# Patient Record
Sex: FEMALE | Race: ASIAN | NOT HISPANIC OR LATINO | URBAN - METROPOLITAN AREA
[De-identification: names, ages, dates, MRNs, and addresses within clinical notes are randomized per-mention and may not be internally consistent; named-entity substitution may affect disease eponyms.]

---

## 2017-02-06 ENCOUNTER — AMBULATORY - HEALTHEAST (OUTPATIENT)
Dept: INTERNAL MEDICINE | Facility: CLINIC | Age: 27
End: 2017-02-06

## 2017-02-07 ENCOUNTER — OFFICE VISIT - HEALTHEAST (OUTPATIENT)
Dept: INTERNAL MEDICINE | Facility: CLINIC | Age: 27
End: 2017-02-07

## 2017-02-07 DIAGNOSIS — Z00.00 ROUTINE GENERAL MEDICAL EXAMINATION AT A HEALTH CARE FACILITY: ICD-10-CM

## 2017-02-07 DIAGNOSIS — R00.2 INTERMITTENT PALPITATIONS: ICD-10-CM

## 2017-02-07 LAB
CHOLEST SERPL-MCNC: 199 MG/DL
FASTING STATUS PATIENT QL REPORTED: YES
HDLC SERPL-MCNC: 85 MG/DL
LDLC SERPL CALC-MCNC: 103 MG/DL
TRIGL SERPL-MCNC: 54 MG/DL

## 2017-02-07 ASSESSMENT — MIFFLIN-ST. JEOR: SCORE: 1132.98

## 2017-02-08 ENCOUNTER — COMMUNICATION - HEALTHEAST (OUTPATIENT)
Dept: INTERNAL MEDICINE | Facility: CLINIC | Age: 27
End: 2017-02-08

## 2017-02-08 LAB
ATRIAL RATE - MUSE: 62 BPM
DIASTOLIC BLOOD PRESSURE - MUSE: NORMAL MMHG
INTERPRETATION ECG - MUSE: NORMAL
P AXIS - MUSE: -14 DEGREES
PR INTERVAL - MUSE: 150 MS
QRS DURATION - MUSE: 84 MS
QT - MUSE: 420 MS
QTC - MUSE: 426 MS
R AXIS - MUSE: 72 DEGREES
SYSTOLIC BLOOD PRESSURE - MUSE: NORMAL MMHG
T AXIS - MUSE: 59 DEGREES
VENTRICULAR RATE- MUSE: 62 BPM

## 2017-04-20 ENCOUNTER — COMMUNICATION - HEALTHEAST (OUTPATIENT)
Dept: INTERNAL MEDICINE | Facility: CLINIC | Age: 27
End: 2017-04-20

## 2017-04-20 ENCOUNTER — HOSPITAL ENCOUNTER (OUTPATIENT)
Dept: CARDIOLOGY | Facility: HOSPITAL | Age: 27
Discharge: HOME | End: 2017-04-20
Attending: INTERNAL MEDICINE

## 2017-04-20 DIAGNOSIS — Z00.00 ROUTINE GENERAL MEDICAL EXAMINATION AT A HEALTH CARE FACILITY: ICD-10-CM

## 2017-04-20 LAB
AORTIC ROOT: 2.4 CM
ASCENDING AORTA: 1.9 CM
BSA FOR ECHO PROCEDURE: 1.42 M2
CV BLOOD PRESSURE: NORMAL MMHG
CV ECHO HEIGHT: 61.5 IN
CV ECHO WEIGHT: 102 LBS
DOP CALC LVOT AREA: 2.54 CM2
DOP CALC LVOT DIAMETER: 1.8 CM
DOP CALC LVOT PEAK VEL: 109 CM/S
DOP CALC LVOT STROKE VOLUME: 61.8 CM3
DOP CALCLVOT PEAK VEL VTI: 24.3 CM
ECHO EJECTION FRACTION ESTIMATED: 65 %
EJECTION FRACTION: 71 % (ref 55–75)
FRACTIONAL SHORTENING: 39.3 % (ref 28–44)
INTERVENTRICULAR SEPTUM IN END DIASTOLE: 0.73 CM (ref 0.6–0.9)
IVS/PW RATIO: 0.9
LA AREA 1: 10.5 CM2
LA AREA 2: 8.6 CM2
LEFT ATRIUM LENGTH: 3.6 CM
LEFT ATRIUM SIZE: 2.3 CM
LEFT ATRIUM VOLUME INDEX: 15 ML/M2
LEFT ATRIUM VOLUME: 21.3 CM3
LEFT VENTRICLE CARDIAC INDEX: 2.3 L/MIN/M2
LEFT VENTRICLE CARDIAC OUTPUT: 3.2 L/MIN
LEFT VENTRICLE DIASTOLIC VOLUME INDEX: 22.5 CM3/M2 (ref 34–74)
LEFT VENTRICLE DIASTOLIC VOLUME: 31.9 CM3 (ref 46–106)
LEFT VENTRICLE HEART RATE: 52 BPM
LEFT VENTRICLE MASS INDEX: 67.3 G/M2
LEFT VENTRICLE SYSTOLIC VOLUME INDEX: 6.5 CM3/M2 (ref 11–31)
LEFT VENTRICLE SYSTOLIC VOLUME: 9.19 CM3 (ref 14–42)
LEFT VENTRICULAR INTERNAL DIMENSION IN DIASTOLE: 4.15 CM (ref 3.8–5.2)
LEFT VENTRICULAR INTERNAL DIMENSION IN SYSTOLE: 2.52 CM (ref 2.2–3.5)
LEFT VENTRICULAR MASS: 95.6 G
LEFT VENTRICULAR OUTFLOW TRACT MEAN GRADIENT: 3 MMHG
LEFT VENTRICULAR OUTFLOW TRACT MEAN VELOCITY: 85.2 CM/S
LEFT VENTRICULAR OUTFLOW TRACT PEAK GRADIENT: 5 MMHG
LEFT VENTRICULAR POSTERIOR WALL IN END DIASTOLE: 0.82 CM (ref 0.6–0.9)
LV STROKE VOLUME INDEX: 43.5 ML/M2
MITRAL VALVE E/A RATIO: 2.1
MV AVERAGE E/E' RATIO: 9.2 CM/S
MV DECELERATION TIME: 250 MS
MV E'TISSUE VEL-LAT: 15.5 CM/S
MV E'TISSUE VEL-MED: 12.1 CM/S
MV LATERAL E/E' RATIO: 8.2
MV MEDIAL E/E' RATIO: 10.5
MV PEAK A VELOCITY: 60.2 CM/S
MV PEAK E VELOCITY: 127 CM/S
NUC REST DIASTOLIC VOLUME INDEX: 1632 LBS
NUC REST SYSTOLIC VOLUME INDEX: 61.5 IN
PR MAX PG: 2 MMHG
PR PEAK VELOCITY: 67.9 CM/S
TRICUSPID VALVE ANULAR PLANE SYSTOLIC EXCURSION: 2.6 CM

## 2017-04-20 ASSESSMENT — MIFFLIN-ST. JEOR: SCORE: 1132.98

## 2017-06-05 ENCOUNTER — OFFICE VISIT - HEALTHEAST (OUTPATIENT)
Dept: INTERNAL MEDICINE | Facility: CLINIC | Age: 27
End: 2017-06-05

## 2017-06-05 DIAGNOSIS — H04.202 EXCESSIVE TEARING, LEFT: ICD-10-CM

## 2017-06-05 DIAGNOSIS — G51.0 BELL'S PALSY: ICD-10-CM

## 2017-06-05 RX ORDER — PREDNISONE 20 MG/1
TABLET ORAL
Qty: 15 TABLET | Refills: 0 | Status: SHIPPED | OUTPATIENT
Start: 2017-06-05

## 2017-06-14 ENCOUNTER — OFFICE VISIT - HEALTHEAST (OUTPATIENT)
Dept: INTERNAL MEDICINE | Facility: CLINIC | Age: 27
End: 2017-06-14

## 2017-06-14 DIAGNOSIS — G51.0 BELL PALSY: ICD-10-CM

## 2021-05-30 VITALS — HEIGHT: 62 IN | BODY MASS INDEX: 18.77 KG/M2 | WEIGHT: 102 LBS

## 2021-05-30 VITALS — HEIGHT: 62 IN | WEIGHT: 102 LBS | BODY MASS INDEX: 18.77 KG/M2

## 2021-05-31 VITALS — BODY MASS INDEX: 19.15 KG/M2 | WEIGHT: 103 LBS

## 2021-05-31 VITALS — BODY MASS INDEX: 18.68 KG/M2 | WEIGHT: 100.5 LBS

## 2021-06-08 NOTE — PROGRESS NOTES
Office Visit-New Patient   Domenico Carpenter   26 y.o. female    Date of Visit: 2/7/2017    Chief Complaint   Patient presents with     Establish Care     physical, fasting        Assessment and Plan   1. Routine general medical examination at a health care facility  Informed and advised her comprehensive physical exam is normal. Due for her first PAP smear. Prefers a woman physician. Refer to Gyne. Due for her HPV vaccine. Give first dose today, 2nd dose in a month and 3rd dose in 6 months. Check the following.   - Ambulatory referral to Gynecology  - Lipid Cascade  - Comprehensive Metabolic Panel  - HM2(CBC w/o Differential)  - Urinalysis-UC if Indicated  - HPV vaccine 9 valent 3 dose IM    2. Intermittent palpitations  Reports of intermittent heart palpitations. Last for a few seconds without sob and chest pains. Heart exam shows a faint systolic murmur but has regular rhythm. Did her EKG and this is normal. Will do a heart echo. Will inform her of her echo result.   - Electrocardiogram Perform and Read  - Echo Complete; Future       History of Present Illness   This 26 y.o. old female, new to the clinic, to establish her care and get her first physical exam. Complains of intermittent palpitations. No associated symptoms. Last for a few seconds. Cannot think of precipitating factors. Sees and hears well. Does not have chest pains and shortness of breath. Denies heartburn, nausea and abdominal pains. Does not urinary and bowel symptoms. Sleeps well. Good appetite. Maintains her weight. Does treadmill for 30 minutes 3 times a week. Falls into low BMI of 18. Menses occur monthly and last for 3 to 5 days.      Review of Systems   A 12 point comprehensive review of systems was negative except as noted..     Medications, Allergies and Problem List   Reviewed and updated             Chief Complaint   Establish Care (physical, fasting)       Patient Profile   Social History     Social History Narrative    Single.  "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 Nonsmoker. Drinks beer socially.         Past Medical History   There is no problem list on file for this patient.      Past Surgical History  She has no past surgical history on file.       Medications and Allergies   No current outpatient prescriptions on file.     No Known Allergies     Family and Social History   Family History   Problem Relation Age of Onset     Hyperlipidemia Mother      Hypertension Mother      Hyperlipidemia Father         Social History   Substance Use Topics     Smoking status: Never Smoker     Smokeless tobacco: None     Alcohol use None        Physical Exam       Physical Exam  Visit Vitals     /70     Pulse 61     Ht 5' 1.5\" (1.562 m)     Wt 102 lb (46.3 kg)     SpO2 98%     BMI 18.96 kg/m2     General Appearance:    Alert, cooperative, no distress, appears stated age, pleasant   Head:    Normocephalic, without obvious abnormality, atraumatic   Eyes:    PERRL, conjunctiva/corneas clear, EOM's intact, fundi     benign, both eyes   Ears:    Normal TM's and external ear canals, both ears   Nose:   Nares normal, septum midline, mucosa normal, no drainage    or sinus tenderness   Throat:   Lips, mucosa, and tongue normal; teeth and gums normal   Neck:   Supple, symmetrical, trachea midline, no adenopathy;     thyroid:  no enlargement/tenderness/nodules; no carotid    bruit or JVD   Back:     Symmetric, no curvature, ROM normal, no CVA tenderness   Lungs:     Clear to auscultation bilaterally, respirations unlabored   Chest Wall:    No tenderness or deformity    Heart:    Regular rate and rhythm, S1 and S2 normal, no murmur, rub   or gallop   Breast Exam:    Deferred. To be done by gynecology.   Abdomen:     Soft, non-tender, bowel sounds active all four quadrants,     no masses, no organomegaly   Genitalia:    Deferred. To be done by gynecology   Extremities:   Extremities normal, atraumatic, no cyanosis or edema   Pulses:   " 2+ and symmetric all extremities   Skin:   Skin color, texture, turgor normal, no rashes or lesions   Lymph nodes:   Cervical, supraclavicular, and axillary nodes normal   Neurologic:   CNII-XII intact, normal strength, sensation and reflexes     throughout        Additional Information        Prince Sadler MD  Internal Medicine  Contact me at [phone]     Additional Information   No current outpatient prescriptions on file.     No Known Allergies  Social History   Substance Use Topics     Smoking status: Never Smoker     Smokeless tobacco: None     Alcohol use None

## 2021-06-11 NOTE — PROGRESS NOTES
Office Visit - Follow Up   Domenico Valencia Carpenter   27 y.o. female    Date of Visit: 6/14/2017    Chief Complaint   Patient presents with     Follow-up     1 week,        Assessment and Plan   1. Bell palsy  Was seen for a Bell's palsy last week.  Had sudden onset of weakness of the left nasolabial fold and left upper eyelid.  Has weak left side of the mouth and when she smiles  left nasolabial fold is shallow.  Has weak left eye closure so the left eye is partially open.  Was assessed to be Bell's palsy.  Treated with short course of prednisone.  Left eye upper lid weakness and left shallow nasolabial fold are resolved.  Does not exhibit residual effect of Bell's palsy.    Discussed unknown cause for Bell's palsy and its pathophysiology.    Follow up as needed.  Reassured patient.       History of Present Illness   This 27 y.o. old female is here for follow-up.  Was seen a week ago for Bell's palsy presenting as weakness of the left upper lid and shallow left nasolabial fold.  Treated with short course of prednisone.  Her symptoms are  resolved.  No residual effect of Bell's palsy.  Doing and feeling well.    Review of Systems   A 12 point comprehensive review of systems was negative except as noted..     Medications, Allergies and Problem List   Reviewed and updated             Chief Complaint   Follow-up (1 week,)       Patient Profile   Social History     Social History Narrative    Single. 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 Nonsmoker. Drinks beer socially.         Past Medical History   There is no problem list on file for this patient.      Past Surgical History  She has no past surgical history on file.       Medications and Allergies   Current Outpatient Prescriptions   Medication Sig     predniSONE (DELTASONE) 20 MG tablet Take 1 tablet 3 times a day for 3 days, 1 tablet twice a day for 2 days and 1 daily for 2 days then stop.     No Known Allergies     Family and Social History    Family History   Problem Relation Age of Onset     Hyperlipidemia Mother      Hypertension Mother      Hyperlipidemia Father         Social History   Substance Use Topics     Smoking status: Never Smoker     Smokeless tobacco: Not on file     Alcohol use Not on file        Physical Exam       Physical Exam  /68  Pulse 64  Wt 103 lb (46.7 kg)  LMP 06/07/2017  BMI 19.15 kg/m2  General appearance: alert, appears stated age, cooperative and no distress  Head: Normocephalic, without obvious abnormality, atraumatic  Eyes: Left upper lid weakness is resolved.  Able to close her left eye well similar to her right eye.  Mouth: Resolved shallow left nasolabial fold  Throat: lips, mucosa, and tongue normal; teeth and gums normal  Neck: no adenopathy, no carotid bruit, no JVD, supple, symmetrical, trachea midline and thyroid not enlarged, symmetric, no tenderness/mass/nodules  Lungs: clear to auscultation bilaterally  Heart: regular rate and rhythm, S1, S2 normal, no murmur, click, rub or gallop  Abdomen: soft, non-tender; bowel sounds normal; no masses,  no organomegaly  Extremities: extremities normal, atraumatic, no cyanosis or edema  Skin: Skin color, texture, turgor normal. No rashes or lesions  Neurologic: Grossly normal     Additional Information        Prince Sadler MD  Internal Medicine  Contact me at [phone]     Additional Information   Current Outpatient Prescriptions   Medication Sig     predniSONE (DELTASONE) 20 MG tablet Take 1 tablet 3 times a day for 3 days, 1 tablet twice a day for 2 days and 1 daily for 2 days then stop.     No Known Allergies  Social History   Substance Use Topics     Smoking status: Never Smoker     Smokeless tobacco: Not on file     Alcohol use Not on file         Time: total time spent with the patient was 25 minutes of which >50% was spent in counseling and coordination of care

## 2021-06-11 NOTE — PROGRESS NOTES
Office Visit - Follow Up   Domenico Carpenter   27 y.o. female    Date of Visit: 6/5/2017    Chief Complaint   Patient presents with     Paralysis     Left side of face, Left eye watering, smile is off        Assessment and Plan   1. Bell's palsy  Has left Bell's palsy.  Now has asymmetry of her mouth.  There is decreased nasolabial fold on the left when she smiles.  Able to close her left eye completely.  But notices excessive tearing of her left eye. Was seen in the urgent care and was advised to go to the emergency room for reevaluation but she declined.  She came to see me instead today.  I reviewed up-to-date recommendation.  Its recommendation is to treat with prednisone or steroid in the first 3 days of its onset which she  which she started this problem 3 days ago.  Will treat with prednisone 60 mg daily for 3 days, 40 mg daily for 2 days, 20 mg daily for 2 days and then stop since her presentation is mild..   - predniSONE (DELTASONE) 20 MG tablet; Take 1 tablet 3 times a day for 3 days, 1 tablet twice a day for 2 days and 1 daily for 2 days then stop.  Dispense: 15 tablet; Refill: 0    2. Excessive tearing, left  Explained to the patient that her excessive tearing from the left eye is due to her Bell's palsy but I will refer her to ophthalmology just in case.  - Ambulatory referral to Ophthalmology    Follow up in 1 week.       History of Present Illness   This 27 y.o. old female complains of facial asymmetry particular her mouth and some difficulty of closing her left eye with excessive tearing.  This happened 3 days ago.  Went to the urgent care and was assessed for possible Bell's palsy but the Dr. was not 100% sure.  She was recommended to go to the emergency room.  She declined.  Came to see me today.  Has some mild left mouth symmetry having narrow left nasolabial fold.  Cannot completely close her eyes but not forcefully.  Also has excessive tearing coming from her left eye.  Does not have cough,  fever and other symptoms.  Overall feels well.    Review of Systems   A 12 point comprehensive review of systems was negative except as noted..     Medications, Allergies and Problem List   Reviewed and updated             Chief Complaint   Paralysis (Left side of face, Left eye watering, smile is off)       Patient Profile   Social History     Social History Narrative    Single. 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 Nonsmoker. Drinks beer socially.         Past Medical History   There is no problem list on file for this patient.      Past Surgical History  She has no past surgical history on file.       Medications and Allergies   Current Outpatient Prescriptions   Medication Sig     predniSONE (DELTASONE) 20 MG tablet Take 1 tablet 3 times a day for 3 days, 1 tablet twice a day for 2 days and 1 daily for 2 days then stop.     No Known Allergies     Family and Social History   Family History   Problem Relation Age of Onset     Hyperlipidemia Mother      Hypertension Mother      Hyperlipidemia Father         Social History   Substance Use Topics     Smoking status: Never Smoker     Smokeless tobacco: None     Alcohol use None        Physical Exam       Physical Exam  /74  Pulse 60  Wt 100 lb 8 oz (45.6 kg)  BMI 18.68 kg/m2  General appearance: alert, appears stated age, cooperative and no distress  Head: Normocephalic, without obvious abnormality, atraumatic  Throat: lips, mucosa, and tongue normal; teeth and gums normal  Neck: no adenopathy, no carotid bruit, no JVD, supple, symmetrical, trachea midline and thyroid not enlarged, symmetric, no tenderness/mass/nodules  Lungs: clear to auscultation bilaterally  Heart: regular rate and rhythm, S1, S2 normal, no murmur, click, rub or gallop  Abdomen: soft, non-tender; bowel sounds normal; no masses,  no organomegaly  Extremities: extremities normal, atraumatic, no cyanosis or edema  Skin: Skin color, texture, turgor normal. No rashes or  lesions  Mouth and eyes: [place] left nasolabial fold.  Bit weak and closing her left eye but can completely close it.  Excessive tearing coming from the left eye      Additional Information        Prince Sadler MD  Internal Medicine  Contact me at [phone]     Additional Information   Current Outpatient Prescriptions   Medication Sig     predniSONE (DELTASONE) 20 MG tablet Take 1 tablet 3 times a day for 3 days, 1 tablet twice a day for 2 days and 1 daily for 2 days then stop.     No Known Allergies  Social History   Substance Use Topics     Smoking status: Never Smoker     Smokeless tobacco: None     Alcohol use None         Time: total time spent with the patient was 25 minutes of which >50% was spent in counseling and coordination of care